# Patient Record
Sex: FEMALE | Race: WHITE | Employment: FULL TIME | URBAN - NONMETROPOLITAN AREA
[De-identification: names, ages, dates, MRNs, and addresses within clinical notes are randomized per-mention and may not be internally consistent; named-entity substitution may affect disease eponyms.]

---

## 2018-02-19 ENCOUNTER — HOSPITAL ENCOUNTER (EMERGENCY)
Age: 28
Discharge: HOME | End: 2018-02-19
Attending: EMERGENCY MEDICINE
Payer: COMMERCIAL

## 2018-02-19 VITALS
DIASTOLIC BLOOD PRESSURE: 60 MMHG | BODY MASS INDEX: 27.6 KG/M2 | OXYGEN SATURATION: 97 % | RESPIRATION RATE: 16 BRPM | WEIGHT: 150 LBS | HEART RATE: 91 BPM | HEIGHT: 62 IN | SYSTOLIC BLOOD PRESSURE: 117 MMHG | TEMPERATURE: 98.9 F

## 2018-02-19 DIAGNOSIS — J01.00 ACUTE MAXILLARY SINUSITIS, RECURRENCE NOT SPECIFIED: Primary | ICD-10-CM

## 2018-02-19 DIAGNOSIS — H65.02 ACUTE SEROUS OTITIS MEDIA OF LEFT EAR, RECURRENCE NOT SPECIFIED: ICD-10-CM

## 2018-02-19 PROCEDURE — 99212 OFFICE O/P EST SF 10 MIN: CPT

## 2018-02-19 PROCEDURE — 99213 OFFICE O/P EST LOW 20 MIN: CPT | Performed by: EMERGENCY MEDICINE

## 2018-02-19 RX ORDER — DOXYCYCLINE HYCLATE 100 MG
100 TABLET ORAL 2 TIMES DAILY
Qty: 14 TABLET | Refills: 0 | Status: SHIPPED | OUTPATIENT
Start: 2018-02-19 | End: 2018-02-26

## 2018-02-19 RX ORDER — PROMETHAZINE HYDROCHLORIDE AND CODEINE PHOSPHATE 6.25; 1 MG/5ML; MG/5ML
5 SYRUP ORAL 4 TIMES DAILY PRN
Qty: 120 ML | Refills: 0 | Status: SHIPPED | OUTPATIENT
Start: 2018-02-19 | End: 2018-02-24

## 2018-02-19 RX ORDER — DEXTROAMPHETAMINE SACCHARATE, AMPHETAMINE ASPARTATE, DEXTROAMPHETAMINE SULFATE AND AMPHETAMINE SULFATE 3.75; 3.75; 3.75; 3.75 MG/1; MG/1; MG/1; MG/1
15 TABLET ORAL DAILY
COMMUNITY

## 2018-02-19 RX ORDER — FLUCONAZOLE 150 MG/1
150 TABLET ORAL ONCE
Qty: 2 TABLET | Refills: 0 | Status: SHIPPED | OUTPATIENT
Start: 2018-02-19 | End: 2018-02-19

## 2018-02-19 RX ORDER — CETIRIZINE HYDROCHLORIDE, PSEUDOEPHEDRINE HYDROCHLORIDE 5; 120 MG/1; MG/1
1 TABLET, FILM COATED, EXTENDED RELEASE ORAL 2 TIMES DAILY
Qty: 30 TABLET | Refills: 0 | Status: SHIPPED | OUTPATIENT
Start: 2018-02-19 | End: 2018-03-06

## 2018-02-19 ASSESSMENT — PAIN SCALES - GENERAL: PAINLEVEL_OUTOF10: 2

## 2018-02-19 ASSESSMENT — ENCOUNTER SYMPTOMS
SINUS PRESSURE: 1
BACK PAIN: 0
CONSTIPATION: 0
SINUS PAIN: 1
VOMITING: 0
EYE REDNESS: 0
BLOOD IN STOOL: 0
TROUBLE SWALLOWING: 0
SHORTNESS OF BREATH: 0
SORE THROAT: 0
ABDOMINAL PAIN: 0
FACIAL SWELLING: 0
CHOKING: 0
STRIDOR: 0
COUGH: 0
WHEEZING: 0
DIARRHEA: 0
EYE PAIN: 0
NAUSEA: 0
EYE DISCHARGE: 0
VOICE CHANGE: 1

## 2018-02-19 ASSESSMENT — PAIN DESCRIPTION - LOCATION: LOCATION: HEAD

## 2018-02-19 NOTE — ED PROVIDER NOTES
2\" (1.575 m)       Medications - No data to display  PROCEDURES:  None  FINAL IMPRESSION      1. Acute maxillary sinusitis, recurrence not specified    2. Acute serous otitis media of left ear, recurrence not specified        DISPOSITION/PLAN   DISPOSITION Decision To Discharge 02/19/2018 07:05:15 PM  nontoxic, well-hydrated, normal airway. No airway abscess or epiglottitis, sepsis, CNS infection, pneumonia, hypoxia, bronchospasm. Patient has acute sinusitis and left serous otitis. Will treat with doxycycline, Zyrtec-D, Phenergan With Codeine, Tylenol, Motrin, increased oral clear liquids, vaporizer, rest.  Patient to recheck with PCP in 7 days if problems persist, and understands to go to ED if worse. PATIENT REFERRED TO:  recheck with primary care physician, use referral number. Schedule an appointment as soon as possible for a visit in 7 days  recheck with primary care physician, use referral number. Go to emergency if worse    DISCHARGE MEDICATIONS:  Discharge Medication List as of 2/19/2018  7:10 PM      START taking these medications    Details   doxycycline hyclate (VIBRA-TABS) 100 MG tablet Take 1 tablet by mouth 2 times daily for 7 days, Disp-14 tablet, R-0Print      promethazine-codeine (PHENERGAN WITH CODEINE) 6.25-10 MG/5ML syrup Take 5 mLs by mouth 4 times daily as needed for Cough (5-10 MLS by mouth 4 times daily for cough) for up to 5 days Not at work will cause drowsiness. , Disp-120 mL, R-0Print      cetirizine-psuedoephedrine (ZYRTEC-D) 5-120 MG per extended release tablet Take 1 tablet by mouth 2 times daily for 30 doses 1 by mouth twice daily for sinus pain and pressure, postnasal drainage, congestion, ear pain and pressure, Disp-30 tablet, R-0Print           Discharge Medication List as of 2/19/2018  7:10 PM          MD Hilda Herbert MD  02/19/18 5453

## 2022-07-20 ENCOUNTER — HOSPITAL ENCOUNTER (EMERGENCY)
Age: 32
Discharge: HOME | End: 2022-07-20
Payer: COMMERCIAL

## 2022-07-20 VITALS
OXYGEN SATURATION: 96 % | SYSTOLIC BLOOD PRESSURE: 120 MMHG | RESPIRATION RATE: 16 BRPM | HEIGHT: 62 IN | TEMPERATURE: 97.9 F | WEIGHT: 130 LBS | DIASTOLIC BLOOD PRESSURE: 67 MMHG | BODY MASS INDEX: 23.92 KG/M2 | HEART RATE: 77 BPM

## 2022-07-20 DIAGNOSIS — H01.001 BLEPHARITIS OF RIGHT UPPER EYELID, UNSPECIFIED TYPE: Primary | ICD-10-CM

## 2022-07-20 DIAGNOSIS — H66.90 ACUTE OTITIS MEDIA, UNSPECIFIED OTITIS MEDIA TYPE: ICD-10-CM

## 2022-07-20 LAB — SARS-COV-2, NAA: NOT  DETECTED

## 2022-07-20 PROCEDURE — 99202 OFFICE O/P NEW SF 15 MIN: CPT | Performed by: NURSE PRACTITIONER

## 2022-07-20 PROCEDURE — 87635 SARS-COV-2 COVID-19 AMP PRB: CPT

## 2022-07-20 PROCEDURE — 99203 OFFICE O/P NEW LOW 30 MIN: CPT

## 2022-07-20 RX ORDER — ERYTHROMYCIN 5 MG/G
0.5 OINTMENT OPHTHALMIC 2 TIMES DAILY
Qty: 1 EACH | Refills: 0 | Status: SHIPPED | OUTPATIENT
Start: 2022-07-20 | End: 2022-07-27

## 2022-07-20 RX ORDER — AZITHROMYCIN 250 MG/1
TABLET, FILM COATED ORAL
Qty: 1 PACKET | Refills: 0 | Status: SHIPPED | OUTPATIENT
Start: 2022-07-20 | End: 2022-07-24

## 2022-07-20 ASSESSMENT — ENCOUNTER SYMPTOMS
CHEST TIGHTNESS: 0
EYE REDNESS: 1
WHEEZING: 0
CRUSTING: 0
EYE INFLAMMATION: 0
EYE WATERING: 0
EYE ITCHING: 0
BLIND SPOTS: 0
SINUS PRESSURE: 1
PERI-ORBITAL EDEMA: 0
DOUBLE VISION: 0
NAUSEA: 0
APNEA: 0
ABDOMINAL PAIN: 0
BLURRED VISION: 0
RHINORRHEA: 1
EYE DISCHARGE: 1
CHOKING: 0
SHORTNESS OF BREATH: 0
COUGH: 1
CONSTIPATION: 0
SINUS PAIN: 0
VOMITING: 0
DIARRHEA: 0
SORE THROAT: 0
TROUBLE SWALLOWING: 0
VOICE CHANGE: 0
STRIDOR: 0
PHOTOPHOBIA: 0

## 2022-07-20 ASSESSMENT — VISUAL ACUITY: OU: 1

## 2022-07-20 ASSESSMENT — PAIN - FUNCTIONAL ASSESSMENT: PAIN_FUNCTIONAL_ASSESSMENT: NONE - DENIES PAIN

## 2022-07-20 NOTE — ED NOTES
To [facility] with complaints of possible pink eye right eye. States it started when she woke up today.  States some drainage and swelling     Alberto Portillo RN  07/20/22 8210

## 2022-07-20 NOTE — DISCHARGE INSTRUCTIONS
Wash hands good  Wipe eyes from nose to ear  Monitor for any increase in redness, pain or drainage  Monitor any visual changes  No Contacts x 1 week if patient wear contacts  Follow up with PCP x 48 - 72 hours if no better    Drink lots of fluids  Take Motrin or Tylenol for headache  Humidification of air  Use nasal spray as directed  Take a nasal decongestant as directed  Monitor for any fever increased and sinus pain or pressure  Follow-up see her primary care provider in 48 hours  Or go to the emergency department for any changes or concerns.

## 2022-07-20 NOTE — ED PROVIDER NOTES
[place] [id number]  Urgent Care Encounter      CHIEF COMPLAINT       Chief Complaint   Patient presents with    Conjunctivitis     right       Nurses Notes reviewed and I agree except as noted in the HPI. HISTORY OFPRESENT ILLNESS   Elisha Webb is a 27 y.o. The history is provided by the patient. No  was used. Eye Problem  Location:  Right eye  Quality:  Aching  Severity:  Mild  Onset quality:  Gradual  Duration:  3 days  Timing:  Constant  Progression:  Worsening  Chronicity:  New  Context: not burn, not chemical exposure, not contact lens problem, not direct trauma, not foreign body, not using machinery, not scratch, not smoke exposure and not UV exposure    Relieved by:  Nothing  Worsened by:  Contact  Ineffective treatments:  None tried  Associated symptoms: discharge, headaches and redness    Associated symptoms: no blurred vision, no crusting, no decreased vision, no double vision, no facial rash, no inflammation, no itching, no nausea, no numbness, no photophobia, no scotomas, no swelling, no tearing, no tingling, no vomiting and no weakness    Risk factors: no conjunctival hemorrhage, no exposure to pinkeye, no previous injury to eye, no recent herpes zoster and no recent URI      REVIEW OF SYSTEMS     Review of Systems   Constitutional:  Positive for activity change, appetite change and fatigue. Negative for chills and diaphoresis. HENT:  Positive for congestion, ear pain, postnasal drip, rhinorrhea and sinus pressure. Negative for sinus pain, sneezing, sore throat, tinnitus, trouble swallowing and voice change. Eyes:  Positive for discharge and redness. Negative for blurred vision, double vision, photophobia and itching. Respiratory:  Positive for cough. Negative for apnea, choking, chest tightness, shortness of breath, wheezing and stridor. Cardiovascular:  Negative for chest pain, palpitations and leg swelling.    Gastrointestinal:  Negative for abdominal pain, constipation, diarrhea, nausea and vomiting. Neurological:  Positive for headaches. Negative for dizziness, tingling, weakness, light-headedness and numbness. PAST MEDICAL HISTORY         Diagnosis Date    Herpes genitalia     Kidney stone        SURGICAL HISTORY     Patient  has no past surgical history on file. CURRENT MEDICATIONS       Discharge Medication List as of 7/20/2022  6:04 PM        CONTINUE these medications which have NOT CHANGED    Details   amphetamine-dextroamphetamine (ADDERALL, 15MG,) 15 MG tablet Take 15 mg by mouth daily. Historical Med      UNABLE TO FIND Historical Med      cetirizine (ZYRTEC ALLERGY) 10 MG tablet Take 1 tablet by mouth daily, Disp-14 tablet, R-0             ALLERGIES     Patient is is allergic to latex. FAMILY HISTORY     Patient's family history is not on file. SOCIAL HISTORY     Patient  reports that she has been smoking cigarettes. She has been smoking an average of .25 packs per day. She has never used smokeless tobacco. She reports current alcohol use. She reports that she does not use drugs. PHYSICAL EXAM     ED TRIAGE VITALS  BP: 120/67, Temp: 97.9 °F (36.6 °C), Heart Rate: 77, Resp: 16, SpO2: 96 %  Physical Exam  Vitals and nursing note reviewed. Constitutional:       General: She is awake. She is not in acute distress. Appearance: Normal appearance. She is well-developed, well-groomed and normal weight. She is not ill-appearing, toxic-appearing or diaphoretic. HENT:      Head: Normocephalic and atraumatic. Right Ear: Hearing, ear canal and external ear normal. A middle ear effusion is present. Left Ear: Hearing, tympanic membrane, ear canal and external ear normal.      Nose: Congestion and rhinorrhea present. Mouth/Throat:      Mouth: Mucous membranes are moist.   Eyes:      General: Lids are everted, no foreign bodies appreciated. Vision grossly intact. Gaze aligned appropriately.  No allergic shiner, visual field deficit or scleral icterus. Right eye: No foreign body, discharge or hordeolum. Extraocular Movements: Extraocular movements intact. Conjunctiva/sclera: Conjunctivae normal.     Pulmonary:      Effort: Pulmonary effort is normal. No respiratory distress. Breath sounds: Normal breath sounds. No stridor. No wheezing, rhonchi or rales. Chest:      Chest wall: No tenderness. Musculoskeletal:         General: Normal range of motion. Cervical back: Normal range of motion. Skin:     General: Skin is warm. Neurological:      General: No focal deficit present. Mental Status: She is alert and oriented to person, place, and time. Psychiatric:         Mood and Affect: Mood normal.         Behavior: Behavior normal. Behavior is cooperative. Thought Content: Thought content normal.         Judgment: Judgment normal.       DIAGNOSTIC RESULTS   Labs:  Results for orders placed or performed during the hospital encounter of 07/20/22   COVID-19, Rapid   Result Value Ref Range    SARS-CoV-2, SRAVANI NOT  DETECTED NOT DETECTED       IMAGING:  No orders to display     URGENT CARE COURSE:     Vitals:    07/20/22 1728   BP: 120/67   Pulse: 77   Resp: 16   Temp: 97.9 °F (36.6 °C)   TempSrc: Temporal   SpO2: 96%   Weight: 130 lb (59 kg)   Height: 5' 2\" (1.575 m)       Medications - No data to display  PROCEDURES:  None  FINAL IMPRESSION      1. Blepharitis of right upper eyelid, unspecified type    2.  Acute otitis media, unspecified otitis media type        DISPOSITION/PLAN   Decision To Discharge      Wash hands good  Wipe eyes from nose to ear  Monitor for any increase in redness, pain or drainage  Monitor any visual changes  No Contacts x 1 week if patient wear contacts  Follow up with PCP x 48 - 72 hours if no better  Drink lots of fluids  Take Motrin or Tylenol for headache  Humidification of air  Use nasal spray as directed  Take a nasal decongestant as directed  Monitor for any fever increased and sinus pain or pressure  Follow-up see her primary care provider in 48 hours  Or go to the emergency department for any changes or concerns.        PATIENT REFERRED TO:  [address] [place] [id number]  Call today  [phone]  DISCHARGE MEDICATIONS:  Discharge Medication List as of 7/20/2022  6:04 PM        START taking these medications    Details   azithromycin (ZITHROMAX Z-COURTNEY) 250 MG tablet Take 2 tablets (500 mg) on Day 1, and then take 1 tablet (250 mg) on days 2 through 5., Disp-1 packet, R-0Normal      erythromycin (ROMYCIN) 5 MG/GM ophthalmic ointment Place 0.5 cm into the right eye in the morning and at bedtime for 7 days, Right Eye, 2 times daily Starting Wed 7/20/2022, Until Wed 7/27/2022, For 7 days, Disp-1 each, R-0, Normal           Discharge Medication List as of 7/20/2022  6:04 PM          RADHA Schmidt CNP, APRN - CNP  07/20/22 1828

## 2023-07-11 ENCOUNTER — HOSPITAL ENCOUNTER (EMERGENCY)
Age: 33
Discharge: HOME | End: 2023-07-11
Attending: EMERGENCY MEDICINE
Payer: COMMERCIAL

## 2023-07-11 VITALS
OXYGEN SATURATION: 97 % | TEMPERATURE: 98 F | DIASTOLIC BLOOD PRESSURE: 67 MMHG | SYSTOLIC BLOOD PRESSURE: 94 MMHG | WEIGHT: 140 LBS | BODY MASS INDEX: 25.61 KG/M2 | RESPIRATION RATE: 16 BRPM | HEART RATE: 65 BPM

## 2023-07-11 DIAGNOSIS — T40.711A ACCIDENTAL CANNABIS OVERDOSE, INITIAL ENCOUNTER: Primary | ICD-10-CM

## 2023-07-11 LAB
ALBUMIN SERPL BCG-MCNC: 4.2 G/DL (ref 3.5–5.1)
ALP SERPL-CCNC: 55 U/L (ref 38–126)
ALT SERPL W/O P-5'-P-CCNC: 12 U/L (ref 11–66)
ANION GAP SERPL CALC-SCNC: 13 MEQ/L (ref 8–16)
APAP SERPL-MCNC: < 5 UG/ML (ref 0–20)
AST SERPL-CCNC: 10 U/L (ref 5–40)
BASOPHILS ABSOLUTE: 0.1 THOU/MM3 (ref 0–0.1)
BASOPHILS NFR BLD AUTO: 0.6 %
BILIRUB SERPL-MCNC: 0.3 MG/DL (ref 0.3–1.2)
BUN SERPL-MCNC: 11 MG/DL (ref 7–22)
CALCIUM SERPL-MCNC: 8.9 MG/DL (ref 8.5–10.5)
CHLORIDE SERPL-SCNC: 107 MEQ/L (ref 98–111)
CO2 SERPL-SCNC: 25 MEQ/L (ref 23–33)
CREAT SERPL-MCNC: 0.5 MG/DL (ref 0.4–1.2)
DEPRECATED RDW RBC AUTO: 37.9 FL (ref 35–45)
EOSINOPHIL NFR BLD AUTO: 0.7 %
EOSINOPHILS ABSOLUTE: 0.1 THOU/MM3 (ref 0–0.4)
ERYTHROCYTE [DISTWIDTH] IN BLOOD BY AUTOMATED COUNT: 11.9 % (ref 11.5–14.5)
ETHANOL SERPL-MCNC: < 0.01 %
GFR SERPL CREATININE-BSD FRML MDRD: > 60 ML/MIN/1.73M2
GLUCOSE SERPL-MCNC: 101 MG/DL (ref 70–108)
HCT VFR BLD AUTO: 41.4 % (ref 37–47)
HGB BLD-MCNC: 14.1 GM/DL (ref 12–16)
IMM GRANULOCYTES # BLD AUTO: 0.02 THOU/MM3 (ref 0–0.07)
IMM GRANULOCYTES NFR BLD AUTO: 0.2 %
LYMPHOCYTES ABSOLUTE: 3.9 THOU/MM3 (ref 1–4.8)
LYMPHOCYTES NFR BLD AUTO: 45.8 %
MCH RBC QN AUTO: 29.6 PG (ref 26–33)
MCHC RBC AUTO-ENTMCNC: 34.1 GM/DL (ref 32.2–35.5)
MCV RBC AUTO: 87 FL (ref 81–99)
MONOCYTES ABSOLUTE: 0.3 THOU/MM3 (ref 0.4–1.3)
MONOCYTES NFR BLD AUTO: 4.1 %
NEUTROPHILS NFR BLD AUTO: 48.6 %
NRBC BLD AUTO-RTO: 0 /100 WBC
OSMOLALITY SERPL CALC.SUM OF ELEC: 288.2 MOSMOL/KG (ref 275–300)
PLATELET # BLD AUTO: 252 THOU/MM3 (ref 130–400)
PMV BLD AUTO: 9.3 FL (ref 9.4–12.4)
POTASSIUM SERPL-SCNC: 3.5 MEQ/L (ref 3.5–5.2)
PROT SERPL-MCNC: 6.7 G/DL (ref 6.1–8)
RBC # BLD AUTO: 4.76 MILL/MM3 (ref 4.2–5.4)
SALICYLATES SERPL-MCNC: < 0.3 MG/DL (ref 2–10)
SEGMENTED NEUTROPHILS ABSOLUTE COUNT: 4.1 THOU/MM3 (ref 1.8–7.7)
SODIUM SERPL-SCNC: 145 MEQ/L (ref 135–145)
WBC # BLD AUTO: 8.5 THOU/MM3 (ref 4.8–10.8)

## 2023-07-11 PROCEDURE — 85025 COMPLETE CBC W/AUTO DIFF WBC: CPT

## 2023-07-11 PROCEDURE — 93010 ELECTROCARDIOGRAM REPORT: CPT | Performed by: INTERNAL MEDICINE

## 2023-07-11 PROCEDURE — 80053 COMPREHEN METABOLIC PANEL: CPT

## 2023-07-11 PROCEDURE — 2580000003 HC RX 258: Performed by: EMERGENCY MEDICINE

## 2023-07-11 PROCEDURE — 82077 ASSAY SPEC XCP UR&BREATH IA: CPT

## 2023-07-11 PROCEDURE — 93005 ELECTROCARDIOGRAM TRACING: CPT | Performed by: EMERGENCY MEDICINE

## 2023-07-11 PROCEDURE — 80143 DRUG ASSAY ACETAMINOPHEN: CPT

## 2023-07-11 PROCEDURE — 99284 EMERGENCY DEPT VISIT MOD MDM: CPT

## 2023-07-11 PROCEDURE — 36415 COLL VENOUS BLD VENIPUNCTURE: CPT

## 2023-07-11 PROCEDURE — 80179 DRUG ASSAY SALICYLATE: CPT

## 2023-07-11 RX ORDER — 0.9 % SODIUM CHLORIDE 0.9 %
2000 INTRAVENOUS SOLUTION INTRAVENOUS ONCE
Status: COMPLETED | OUTPATIENT
Start: 2023-07-11 | End: 2023-07-11

## 2023-07-11 RX ADMIN — SODIUM CHLORIDE 2000 ML: 9 INJECTION, SOLUTION INTRAVENOUS at 01:39

## 2023-07-11 ASSESSMENT — PAIN SCALES - GENERAL: PAINLEVEL_OUTOF10: 0

## 2023-07-11 ASSESSMENT — PAIN - FUNCTIONAL ASSESSMENT: PAIN_FUNCTIONAL_ASSESSMENT: 0-10

## 2023-07-11 NOTE — ED PROVIDER NOTES
[facility]  EMERGENCY DEPARTMENT ENCOUNTER        PATIENT NAME: Hernán Horne  MRN: [id number]  : 1990  BATISTA: 2023  PROVIDER: Lucas Guevara MD    CHIEF COMPLAINT       Chief Complaint   Patient presents with    Drug Overdose       Patient is seen and evaluated in a timely fashion. Nurses Notes are reviewed and I agree except as noted in the HPI. HISTORY OF PRESENT ILLNESS   Elisha Webb is a 26 y.o. female who presents to Emergency Department with Drug Overdose     The patient was transported to the Emergency Department (ED) by [facility] (EM[facility]) due to concerns of a potential drug overdose. The onset of symptoms was reportedly around one hour prior to arrival, according to the patient. She disclosed that both she and a friend had been consuming \"dab oils\", leading them to suspect a possible overdose. On arrival the patient appears to be in a stable condition, displaying even and unlabored respirations. No fever or chills. No chest pain. No SOB. No nausea or vomiting. She looks tired and sleepy. This HPI was provided by patient. PAST MEDICAL HISTORY    has a past medical history of Herpes genitalia and Kidney stone. SURGICAL HISTORY      has no past surgical history on file. CURRENT MEDICATIONS       Discharge Medication List as of 2023  3:12 AM        CONTINUE these medications which have NOT CHANGED    Details   amphetamine-dextroamphetamine (ADDERALL, 15MG,) 15 MG tablet Take 15 mg by mouth daily. Historical Med      UNABLE TO FIND Historical Med      cetirizine (ZYRTEC ALLERGY) 10 MG tablet Take 1 tablet by mouth daily, Disp-14 tablet, R-0             ALLERGIES     is allergic to latex. FAMILY HISTORY     She indicated that her mother is alive. She indicated that her father is alive. family history is not on file. SOCIAL HISTORY      reports that she has been smoking cigarettes. She has been smoking an average of .25 packs per day.

## 2023-07-11 NOTE — ED NOTES
This nurse and Dr. Skyla Piña explained to patient the risks of leaving AMA, but patient states that she needs to go to work and feels much better.       Giovanni Leal RN  07/11/23 5674

## 2023-07-11 NOTE — ED TRIAGE NOTES
Pt arrived to ED via EMS with complaints of possible drug over dose. States symptoms started about an hour ago. States her and her friend both were using dab oils and now both feel as though they may have overdosed. EKG completed and IV access obtained. Telemetry applied. Pt appears to be in stable condition with respirations even and unlabored. Pts call light in reach.

## 2023-07-13 LAB
EKG ATRIAL RATE: 88 BPM
EKG P AXIS: 63 DEGREES
EKG P-R INTERVAL: 128 MS
EKG Q-T INTERVAL: 368 MS
EKG QRS DURATION: 104 MS
EKG QTC CALCULATION (BAZETT): 445 MS
EKG R AXIS: 78 DEGREES
EKG T AXIS: 50 DEGREES
EKG VENTRICULAR RATE: 88 BPM

## 2024-04-06 ENCOUNTER — HOSPITAL ENCOUNTER (EMERGENCY)
Age: 34
Discharge: HOME | End: 2024-04-06
Payer: COMMERCIAL

## 2024-04-06 VITALS
WEIGHT: 140 LBS | BODY MASS INDEX: 25.61 KG/M2 | SYSTOLIC BLOOD PRESSURE: 134 MMHG | RESPIRATION RATE: 16 BRPM | TEMPERATURE: 98.4 F | HEART RATE: 95 BPM | DIASTOLIC BLOOD PRESSURE: 80 MMHG | OXYGEN SATURATION: 99 %

## 2024-04-06 DIAGNOSIS — K02.9 PAIN DUE TO DENTAL CARIES: Primary | ICD-10-CM

## 2024-04-06 PROCEDURE — 99284 EMERGENCY DEPT VISIT MOD MDM: CPT

## 2024-04-06 PROCEDURE — 96372 THER/PROPH/DIAG INJ SC/IM: CPT

## 2024-04-06 PROCEDURE — 6360000002 HC RX W HCPCS: Performed by: PHYSICIAN ASSISTANT

## 2024-04-06 PROCEDURE — 6370000000 HC RX 637 (ALT 250 FOR IP): Performed by: PHYSICIAN ASSISTANT

## 2024-04-06 RX ORDER — KETOROLAC TROMETHAMINE 30 MG/ML
30 INJECTION, SOLUTION INTRAMUSCULAR; INTRAVENOUS ONCE
Status: COMPLETED | OUTPATIENT
Start: 2024-04-06 | End: 2024-04-06

## 2024-04-06 RX ORDER — IBUPROFEN 800 MG/1
800 TABLET ORAL EVERY 8 HOURS PRN
Qty: 15 TABLET | Refills: 0 | Status: SHIPPED | OUTPATIENT
Start: 2024-04-06

## 2024-04-06 RX ORDER — PENICILLIN V POTASSIUM 500 MG/1
500 TABLET ORAL 4 TIMES DAILY
Qty: 40 TABLET | Refills: 0 | Status: SHIPPED | OUTPATIENT
Start: 2024-04-06 | End: 2024-04-06

## 2024-04-06 RX ORDER — IBUPROFEN 800 MG/1
800 TABLET ORAL EVERY 8 HOURS PRN
Qty: 15 TABLET | Refills: 0 | Status: SHIPPED | OUTPATIENT
Start: 2024-04-06 | End: 2024-04-06

## 2024-04-06 RX ORDER — LIDOCAINE HYDROCHLORIDE 20 MG/ML
15 SOLUTION OROPHARYNGEAL
Status: COMPLETED | OUTPATIENT
Start: 2024-04-06 | End: 2024-04-06

## 2024-04-06 RX ORDER — LIDOCAINE HYDROCHLORIDE 40 MG/ML
4 SOLUTION TOPICAL ONCE
Status: DISCONTINUED | OUTPATIENT
Start: 2024-04-06 | End: 2024-04-06

## 2024-04-06 RX ORDER — PENICILLIN V POTASSIUM 500 MG/1
500 TABLET ORAL 4 TIMES DAILY
Qty: 40 TABLET | Refills: 0 | Status: SHIPPED | OUTPATIENT
Start: 2024-04-06 | End: 2024-04-16

## 2024-04-06 RX ADMIN — KETOROLAC TROMETHAMINE 30 MG: 30 INJECTION, SOLUTION INTRAMUSCULAR at 18:23

## 2024-04-06 RX ADMIN — Medication 5 ML: at 18:22

## 2024-04-06 RX ADMIN — Medication 15 ML: at 18:37

## 2024-04-06 ASSESSMENT — ENCOUNTER SYMPTOMS
NAUSEA: 0
RHINORRHEA: 0
SORE THROAT: 1
COUGH: 0
TROUBLE SWALLOWING: 0
ABDOMINAL PAIN: 0
FACIAL SWELLING: 0

## 2024-04-06 ASSESSMENT — PAIN SCALES - GENERAL: PAINLEVEL_OUTOF10: 10

## 2024-04-06 ASSESSMENT — PAIN DESCRIPTION - LOCATION: LOCATION: TEETH

## 2024-04-06 NOTE — ED PROVIDER NOTES
[place] EMERGENCY DEPT      Pt Name: Elisha Webb  MRN: [id number]  Birthdate 1990  Date of evaluation: 4/6/2024  Provider: Laura Faria PA-C    CHIEF COMPLAINT       Chief Complaint   Patient presents with    Dental Pain       Nurses Notes reviewed and I agree except as noted in the HPI.      HISTORY OF PRESENT ILLNESS    Elisha Webb is a 34 y.o. female who presents through the lab for tooth ache.  Patient broke a left lower molar a couple weeks ago.  Last night it started hurting.  She has taken 3 doses of leftover amoxicillin 500 mg, 1 dose of ibuprofen 200 mg last night, and Tylenol 1 g and then 500 mg today.  None of these medicines have provided any relief.  The patient reports left ear pain, sore throat, painful swallowing, 1 episode of vomiting last night, and 1 subjective fever.  Patient denies chest pain, dyspnea, cough, nasal congestion, dizziness, blurred vision, or other complaints.  The patient denies possibility of pregnancy.    REVIEW OF SYSTEMS     Review of Systems   Constitutional:  Positive for fever. Negative for chills and fatigue.   HENT:  Positive for dental problem, ear pain and sore throat. Negative for congestion, drooling, facial swelling, rhinorrhea and trouble swallowing.    Respiratory:  Negative for cough and shortness of breath.    Cardiovascular:  Negative for chest pain.   Gastrointestinal:  Positive for vomiting. Negative for abdominal pain and nausea.   Genitourinary:  Negative for decreased urine volume.   Musculoskeletal:  Negative for gait problem and neck pain.   Skin:  Negative for rash.   Neurological:  Negative for dizziness, speech difficulty and headaches.        PAST MEDICAL HISTORY    has a past medical history of Herpes genitalia and Kidney stone.    SURGICAL HISTORY      has no past surgical history on file.    CURRENT MEDICATIONS       Discharge Medication List as of 4/6/2024  6:35 PM        CONTINUE these medications which have NOT CHANGED

## 2024-04-06 NOTE — ED NOTES
Pt presents to the ED with concerns of dental pain that began about 2 weeks ago. She was trying to wait to get into a dentist but the pain got worse today at a 10/10. She has taken some left over amoxicillin with no relief. VSS.

## 2024-07-15 ENCOUNTER — HOSPITAL ENCOUNTER (EMERGENCY)
Age: 34
Discharge: HOME | End: 2024-07-15
Payer: COMMERCIAL

## 2024-07-15 VITALS
SYSTOLIC BLOOD PRESSURE: 117 MMHG | DIASTOLIC BLOOD PRESSURE: 79 MMHG | TEMPERATURE: 97.8 F | RESPIRATION RATE: 14 BRPM | WEIGHT: 140 LBS | BODY MASS INDEX: 25.61 KG/M2 | HEART RATE: 85 BPM | OXYGEN SATURATION: 98 %

## 2024-07-15 DIAGNOSIS — F11.93 WITHDRAWAL FROM OPIOIDS (HCC): Primary | ICD-10-CM

## 2024-07-15 PROCEDURE — 99283 EMERGENCY DEPT VISIT LOW MDM: CPT

## 2024-07-15 PROCEDURE — 6370000000 HC RX 637 (ALT 250 FOR IP)

## 2024-07-15 RX ORDER — CLONIDINE HYDROCHLORIDE 0.1 MG/1
0.1 TABLET ORAL ONCE
Status: COMPLETED | OUTPATIENT
Start: 2024-07-15 | End: 2024-07-15

## 2024-07-15 RX ADMIN — CLONIDINE HYDROCHLORIDE 0.1 MG: 0.1 TABLET ORAL at 18:20

## 2024-07-15 ASSESSMENT — ENCOUNTER SYMPTOMS
WHEEZING: 0
ABDOMINAL PAIN: 0
SORE THROAT: 0
BLOOD IN STOOL: 0
DIARRHEA: 1
COUGH: 0
NAUSEA: 0
SHORTNESS OF BREATH: 0
RHINORRHEA: 0

## 2024-07-15 ASSESSMENT — PAIN DESCRIPTION - LOCATION: LOCATION: BACK;LEG

## 2024-07-15 ASSESSMENT — PAIN SCALES - GENERAL: PAINLEVEL_OUTOF10: 9

## 2024-07-15 ASSESSMENT — PAIN DESCRIPTION - ORIENTATION: ORIENTATION: RIGHT;LEFT

## 2024-07-15 ASSESSMENT — PAIN - FUNCTIONAL ASSESSMENT: PAIN_FUNCTIONAL_ASSESSMENT: 0-10

## 2024-07-15 NOTE — ED NOTES
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 She reports last use 5 days ago. She reports using daily multiple times of the day. She reports having restless legs. She reports her leg will not stop moving and she is having trouble walking. She reports pain in her back. Reports feeling dehydrated. She reports diarrhea.  Reports last oral intake 5 days ago. Reports no menstrual cycle since  October. Reports attempting to withdrawal 3 months ago but she was unable to handle the withdrawal symptoms while led her to start using again.  She attempted to be seen at [facility]. She states \"that place is joke they only want to help if you are in trouble\". Denies any CP/SOB or dizziness. Denies any suicidal ideation.  Pt requesting Clonidine and blankets. Family at bedside.  Level C paged.

## 2024-07-15 NOTE — DISCHARGE INSTRUCTIONS
Please make sure you go to [facility], as provided by our social work, directly from the emergency department today for inpatient detox to manage your withdrawal symptoms.

## 2024-07-15 NOTE — PROGRESS NOTES
In with patient and family to discuss plan of care. States they want inpatient for detox long term. Discussed going to [facility] until they have placement at inpatient. All agreed with plan. Discussed outpatient services after inpatient as well; the importance of counseling, psychiatry, and case management.   1836 - Call to DO Pop to discuss plan of care